# Patient Record
Sex: FEMALE | Race: WHITE | URBAN - NONMETROPOLITAN AREA
[De-identification: names, ages, dates, MRNs, and addresses within clinical notes are randomized per-mention and may not be internally consistent; named-entity substitution may affect disease eponyms.]

---

## 2019-06-08 ENCOUNTER — OFFICE VISIT (OUTPATIENT)
Dept: FAMILY MEDICINE CLINIC | Age: 59
End: 2019-06-08

## 2019-06-08 VITALS
BODY MASS INDEX: 24.98 KG/M2 | SYSTOLIC BLOOD PRESSURE: 112 MMHG | HEART RATE: 76 BPM | WEIGHT: 141 LBS | TEMPERATURE: 98.6 F | OXYGEN SATURATION: 95 % | HEIGHT: 63 IN | DIASTOLIC BLOOD PRESSURE: 70 MMHG

## 2019-06-08 DIAGNOSIS — J01.90 ACUTE SINUSITIS, RECURRENCE NOT SPECIFIED, UNSPECIFIED LOCATION: Primary | ICD-10-CM

## 2019-06-08 DIAGNOSIS — H65.01 RIGHT ACUTE SEROUS OTITIS MEDIA, RECURRENCE NOT SPECIFIED: ICD-10-CM

## 2019-06-08 PROCEDURE — 99203 OFFICE O/P NEW LOW 30 MIN: CPT | Performed by: FAMILY MEDICINE

## 2019-06-08 RX ORDER — DOXYCYCLINE HYCLATE 100 MG
100 TABLET ORAL 2 TIMES DAILY
Qty: 20 TABLET | Refills: 0 | Status: SHIPPED | OUTPATIENT
Start: 2019-06-08 | End: 2019-06-18

## 2019-06-08 RX ORDER — METHYLPREDNISOLONE 4 MG/1
TABLET ORAL
Qty: 21 TABLET | Refills: 0 | Status: SHIPPED | OUTPATIENT
Start: 2019-06-08 | End: 2019-06-14

## 2019-06-08 NOTE — PATIENT INSTRUCTIONS

## 2019-06-08 NOTE — PROGRESS NOTES
Subjective:  Chief Complaint   Patient presents with    Pharyngitis    Congestion    Sinusitis       HPI: The patient states that they have had sinus pressure and congestion for the last 4-5 days. The patient does admit to facial pressure. Admits to cough which is productive of sputum. The patient also reports nasal congestion and sore throat. Denies pain with swallowing/difficulty swallowing. Patient has had yellow rhinorrhea. Mild headache. Denies fevers chills. No dizziness, visual disturbances and or neck stiffness. No chest pain or dyspnea. No vomiting or diarrhea. The patient presents for evaluation. Non smoker, no h/o asthma. ROS:  Const: Positives and pertinent negatives as per HPI. All others reviewed and are negative. Current Outpatient Medications:     doxycycline hyclate (VIBRA-TABS) 100 MG tablet, Take 1 tablet by mouth 2 times daily for 10 days, Disp: 20 tablet, Rfl: 0    methylPREDNISolone (MEDROL DOSEPACK) 4 MG tablet, Take by mouth., Disp: 21 tablet, Rfl: 0  Allergies   Allergen Reactions    Amoxicillin Other (See Comments)     Itchy face       Objective:  Vitals:    06/08/19 0818   BP: 112/70   Pulse: 76   Temp: 98.6 °F (37 °C)   SpO2: 95%   Weight: 141 lb (64 kg)   Height: 5' 3\" (1.6 m)       Exam:  Exam:  Const: Appears healthy and well developed. No signs of acute distress present. Vitals reviewed per triage. Head/Face: Normocephalic, atraumatic. Facies is symmetric. Tenderness is noted over the  right maxillary sinus  Eyes: PERRL. ENMT: Right TM with mild injection and serous effusion left TM gray and translucent. Nares have clear to yellow ** rhinorhea Buccal mucosa is moist. Mild erythema in the posterior pharynx. Purulent PND is noted. Neck: Supple and symmetric. Palpation reveals no adenopathy. No meningeal signs. Trachea midline. Resp: Lungs are clear bilaterally in all lung fields. Chest expansion is symmetrical no accessory muscle use.    CV: S1 is